# Patient Record
Sex: MALE | Race: ASIAN | NOT HISPANIC OR LATINO
[De-identification: names, ages, dates, MRNs, and addresses within clinical notes are randomized per-mention and may not be internally consistent; named-entity substitution may affect disease eponyms.]

---

## 2023-08-24 PROBLEM — Z00.00 ENCOUNTER FOR PREVENTIVE HEALTH EXAMINATION: Status: ACTIVE | Noted: 2023-08-24

## 2023-09-08 ENCOUNTER — APPOINTMENT (OUTPATIENT)
Dept: ORTHOPEDIC SURGERY | Facility: CLINIC | Age: 28
End: 2023-09-08
Payer: OTHER MISCELLANEOUS

## 2023-09-08 VITALS — HEIGHT: 60 IN | WEIGHT: 136 LBS | BODY MASS INDEX: 26.7 KG/M2

## 2023-09-08 DIAGNOSIS — Z78.9 OTHER SPECIFIED HEALTH STATUS: ICD-10-CM

## 2023-09-08 DIAGNOSIS — M54.12 RADICULOPATHY, CERVICAL REGION: ICD-10-CM

## 2023-09-08 PROCEDURE — 99203 OFFICE O/P NEW LOW 30 MIN: CPT

## 2023-09-08 NOTE — WORK
[Torn Ligament/Tendon/Muscle] : torn ligament, tendon or muscle [Was the competent medical cause of the injury] : was the competent medical cause of the injury [Are consistent with the injury] : are consistent with the injury [Consistent with my objective findings] : consistent with my objective findings [Mild Partial] : mild partial [Does not reveal pre-existing condition(s) that may affect treatment/prognosis] : does not reveal pre-existing condition(s) that may affect treatment/prognosis [Can return to work without limitations on ______] : can return to work without limitations on [unfilled] [N/A] : : Not Applicable [Patient] : patient [No Rx restrictions] : No Rx restrictions. [I provided the services listed above] :  I provided the services listed above. [FreeTextEntry1] : 50% [Heavy Work:] : Heavy Work: Exerting 50 to 100 pounds of force occasionally, and/or 25 to 50 pounds of force frequently, and/or 10 to 20 pounds of force constantly to move objects. Physical demand requirements are in excess of those for Medium Work

## 2023-09-08 NOTE — DISCUSSION/SUMMARY
[de-identified] : History, clinical examination and imaging were most consistent with: -right shoulder partial rotator cuff tear -cervical radiculopathy  The diagnosis was explained in detail. The potential non-surgical and surgical treatments were reviewed. The relative risks and benefits of each option were considered relative to the patients age, activity level, medical history, symptom severity and previously attempted treatments.  The patient was advised to consult with their primary medical provider prior to initiation of any new medications to reduce the risk of adverse effects specific to their long-term home medications and medical history. The risk of gastrointestinal irritation and kidney injury specific to long-term NSAID use was discussed.  -Over the counter NSAID as needed for pain. -There is adequate clinical concern the patient may have a condition that could benefit from orthopedic intervention. An MRI was therefore ordered to evaluate for structural abnormality and further guide treatment recommendations. -Referral provided to Dr. overton for further management of his cervical injury.  -Follow up when imaging completed for the shoulder, will likely discuss CSI and return to PT.   (MDM)  Problem Complexity -Moderate: acute, complicated injury  Risk -Low: over the counter medication  -Patient has not been seen by another provider in my practice within the past 2 years who specializes in orthopedic sports medicine, shoulder or elbow surgery.

## 2023-09-08 NOTE — HISTORY OF PRESENT ILLNESS
[de-identified] : Date of initial evaluation is 09/08 Patient age is 28 Occupation is Research Assistant at [employer]  Body part causing symptoms is the RT shoulder and neck Symptoms began while was at work, he was lifting a heavy battery he felt a pop Location of pain is lateral Quality of pain is dull  Pain score at rest is 6/10 Pain score during activity is 9/10 Radicular symptoms are intermittently present on right Prior treatments include PT  Patient's condition is associated with workers compensation, DOI 1/3/23 He has been working full duty

## 2023-09-08 NOTE — IMAGING
[de-identified] : (Exam: Shoulder)   Laterality is right    Patient is in no acute distress, alert and oriented Sensation is grossly intact to light touch in the hand Motor function is 5/5 in the hand Capillary refill is less than 2 seconds in the fingers Lymphadenopathy is not present Peripheral edema is not present   Skin is intact Swelling is not present Atrophy is not present Scapular winging is not present Deformity of the AC joint is not present Deformity of the biceps is not present   Bicipital groove tenderness is present AC joint tenderness is not present Scapulothoracic tenderness is not present   Active forward elevation is 175 Passive forward elevation is 175 External rotation at the side is 80 Internal rotation behind the back is to the level of T7   Forward elevation strength is 5/5 External rotation strength at the side is 5/5 Internal rotation strength at the side is 5/5 Deltoid strength of anterior, posterior and lateral heads is 5/5   Villatoro test is abnormal OBriens test is abnormal Empty can test is abnormal Speeds test is abnormal Cross body adduction test is normal Belly press test is normal Apprehension and relocation is normal Sulcus sign is normal  (Exam: Cervical Spine)   Skin is intact Swelling is not present Atrophy is not present   Bony tenderness is not present Paraspinal tenderness is present Bony stepoff is not present   Range of motion is limited   Shoulder abduction strength is 5/5 Elbow flexion strength is 5/5 Elbow extension strength is 5/5 Wrist flexion strength is 5/5 Wrist extension strength is 5/5 Finger abduction strength is 5/5   C5-T1 sensation is intact  Biceps, triceps and patellar reflexes are 2+ and symmetric  Clonus is not present Red's sign is not present Spurling's test is abnormal    [Straightening consistent with spasm] : Straightening consistent with spasm [No spinal deformity, fracture, lytic lesion, or marked single level collapse] : No spinal deformity, fracture, lytic lesion, or marked single level collapse [Right] : right shoulder [Outside films reviewed] : Outside films reviewed [There are no fractures, subluxations or dislocations. No significant abnormalities are seen] : There are no fractures, subluxations or dislocations. No significant abnormalities are seen

## 2023-09-21 ENCOUNTER — RESULT REVIEW (OUTPATIENT)
Age: 28
End: 2023-09-21

## 2023-10-02 ENCOUNTER — APPOINTMENT (OUTPATIENT)
Dept: ORTHOPEDIC SURGERY | Facility: CLINIC | Age: 28
End: 2023-10-02
Payer: OTHER MISCELLANEOUS

## 2023-10-02 VITALS — BODY MASS INDEX: 26.7 KG/M2 | HEIGHT: 60 IN | WEIGHT: 136 LBS

## 2023-10-02 PROCEDURE — 99214 OFFICE O/P EST MOD 30 MIN: CPT | Mod: 25

## 2023-10-02 PROCEDURE — 20610 DRAIN/INJ JOINT/BURSA W/O US: CPT | Mod: RT

## 2023-10-04 ENCOUNTER — APPOINTMENT (OUTPATIENT)
Dept: ORTHOPEDIC SURGERY | Facility: CLINIC | Age: 28
End: 2023-10-04
Payer: OTHER MISCELLANEOUS

## 2023-10-04 PROCEDURE — 99215 OFFICE O/P EST HI 40 MIN: CPT

## 2023-10-04 RX ORDER — METHYLPREDNISOLONE 4 MG/1
4 TABLET ORAL
Qty: 1 | Refills: 0 | Status: ACTIVE | COMMUNITY
Start: 2023-10-04 | End: 1900-01-01

## 2023-10-04 RX ORDER — CYCLOBENZAPRINE HYDROCHLORIDE 5 MG/1
5 TABLET, FILM COATED ORAL
Qty: 60 | Refills: 0 | Status: ACTIVE | COMMUNITY
Start: 2023-10-04 | End: 1900-01-01

## 2023-11-17 ENCOUNTER — APPOINTMENT (OUTPATIENT)
Dept: ORTHOPEDIC SURGERY | Facility: CLINIC | Age: 28
End: 2023-11-17

## 2023-12-07 ENCOUNTER — APPOINTMENT (OUTPATIENT)
Dept: ORTHOPEDIC SURGERY | Facility: CLINIC | Age: 28
End: 2023-12-07
Payer: OTHER MISCELLANEOUS

## 2023-12-07 PROCEDURE — 99214 OFFICE O/P EST MOD 30 MIN: CPT

## 2023-12-07 RX ORDER — GABAPENTIN 300 MG/1
300 CAPSULE ORAL
Qty: 30 | Refills: 1 | Status: ACTIVE | COMMUNITY
Start: 2023-12-07 | End: 1900-01-01

## 2023-12-08 ENCOUNTER — APPOINTMENT (OUTPATIENT)
Dept: ORTHOPEDIC SURGERY | Facility: CLINIC | Age: 28
End: 2023-12-08
Payer: OTHER MISCELLANEOUS

## 2023-12-08 PROCEDURE — 99214 OFFICE O/P EST MOD 30 MIN: CPT

## 2023-12-08 PROCEDURE — 72100 X-RAY EXAM L-S SPINE 2/3 VWS: CPT

## 2023-12-19 ENCOUNTER — APPOINTMENT (OUTPATIENT)
Dept: NEUROLOGY | Facility: CLINIC | Age: 28
End: 2023-12-19
Payer: OTHER MISCELLANEOUS

## 2023-12-19 PROCEDURE — 95912 NRV CNDJ TEST 11-12 STUDIES: CPT

## 2023-12-19 PROCEDURE — 95886 MUSC TEST DONE W/N TEST COMP: CPT

## 2024-02-09 ENCOUNTER — APPOINTMENT (OUTPATIENT)
Dept: ORTHOPEDIC SURGERY | Facility: CLINIC | Age: 29
End: 2024-02-09

## 2024-03-21 ENCOUNTER — APPOINTMENT (OUTPATIENT)
Dept: ORTHOPEDIC SURGERY | Facility: CLINIC | Age: 29
End: 2024-03-21
Payer: OTHER MISCELLANEOUS

## 2024-03-21 VITALS — HEIGHT: 60 IN | BODY MASS INDEX: 26.7 KG/M2 | WEIGHT: 136 LBS

## 2024-03-21 DIAGNOSIS — M48.02 SPINAL STENOSIS, CERVICAL REGION: ICD-10-CM

## 2024-03-21 DIAGNOSIS — M51.36 OTHER INTERVERTEBRAL DISC DEGENERATION, LUMBAR REGION: ICD-10-CM

## 2024-03-21 DIAGNOSIS — M47.817 SPONDYLOSIS W/OUT MYELOPATHY OR RADICULOPATHY, LUMBOSACRAL REGION: ICD-10-CM

## 2024-03-21 DIAGNOSIS — M62.838 OTHER MUSCLE SPASM: ICD-10-CM

## 2024-03-21 DIAGNOSIS — M51.37 OTHER INTERVERTEBRAL DISC DEGENERATION, LUMBOSACRAL REGION: ICD-10-CM

## 2024-03-21 DIAGNOSIS — M48.061 SPINAL STENOSIS, LUMBAR REGION WITHOUT NEUROGENIC CLAUDICATION: ICD-10-CM

## 2024-03-21 DIAGNOSIS — M47.22 OTHER SPONDYLOSIS WITH RADICULOPATHY, CERVICAL REGION: ICD-10-CM

## 2024-03-21 DIAGNOSIS — M54.12 RADICULOPATHY, CERVICAL REGION: ICD-10-CM

## 2024-03-21 DIAGNOSIS — M54.16 RADICULOPATHY, LUMBAR REGION: ICD-10-CM

## 2024-03-21 PROCEDURE — 99214 OFFICE O/P EST MOD 30 MIN: CPT

## 2024-03-21 NOTE — PHYSICAL EXAM
[TextEntry] : Constitutional: Well groomed and developed.  Respiratory: Normal, unlabored breathing. No use of accessory muscles.  Skin: No rashes or ulcers. Skin warm and dry.  Psychiatric: Oriented to time, place, person and event. No acute distress. Gait: Heel to toe Patient able to walk on toes and heels.    Neck:   Posture: normal  AROM:  Flexion- chin to chest  Extension- 20  R rotation- 45 L rotation- 70 Moderate pain with neck ROM.    Lumbar spine:  Posture: Normal on coronal and sagittal alignment  AROM:  Flexion 90 Extension 10  Moderate pain with simulated truncal motion   Tenderness:  Cervical: Moderate tenderness on palpation     TTP over right paracervical Thoracic: No tenderness on palpation  Lumbar: Moderate tenderness on palpation    TTP on the right L5-S1 Sacrum/coccyx: no tenderness on palpation  Greater trochanteric bursa:  No tenderness  PSIS: None                                          Motor:                       R               L                         UE:                   Deltoid            5                5 WE                 5                5 Triceps          5                5 Biceps           5                5                5                5 Intrinsics       5                5                         R             L LE:                                IS                    5             5 Quad              5             5 TA                  5             5 EHL                5             5 Gastroc         5             5                                   R         L DTR:  Biceps:                     2+        2+ Brachioradialis:        2+        2+ Triceps:                   2+         2+  Sensory: Light touch sensation intact on C5-T1  Spurling sign: Normal  Babinski's sign: Negative Bilaterally Red's sign: Negative Bilaterally  Abduction sign: Negative Bilaterally     R  L DTR: Patella  2+  2+ Achilles  2+  2+  Sensory: Light touch sensation intact T12-S1  Babinski's Sign: Negative bilaterally  Straight leg raise test: Negative bilaterally  YESENIA test: negative bilaterally

## 2024-03-21 NOTE — HISTORY OF PRESENT ILLNESS
[Lower back] : lower back [Sudden] : sudden [9] : 9 [Dull/Aching] : dull/aching [5] : 5 [Sharp] : sharp [Constant] : constant [Part time] : Work status: part time [de-identified] : 03/21/24 Patient presents in office today for his neck and lumbar spine. Here  to review the results from his EMG test. States he is now having more radiating pain down his RT leg. Admits to numbness and tingling. Says his muscles feel very tight.  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

## 2024-03-21 NOTE — DATA REVIEWED
[EMG Nerve Conduction] : A EMG Nerve Conduction test was completed of the [Bilateral] : bilateral [Positive] : positive [Upper extremity] : upper extremity [FreeTextEntry1] : EMG B/L UE: Mild bilateral carpal tunnel

## 2024-03-21 NOTE — ASSESSMENT
[FreeTextEntry1] : MRI C-Spine 9/2023 Right paracentral HNP C5-6 with moderate stenosis Bulging disc C6-7  EMG B/L UE: Mild bilateral carpal tunnel   29 yo male presents today for follow up on his neck and back following work injury 1/2023. Patient with increased pain and spasm in both his neck and back without PT. I recommend resuming PT as well as proceeding with imaging of the lumbar spine given persistent low back pain with radiculopathy.   - Patient given prescription for LE EMG/NCS, follow up after study is completed to discuss results.   - Patient given prescription for lumbar MRI, follow up after study is completed to discuss results.   - Patient with increased muscle spasm, loss of range of motion due to not having PT.  Patient will benefit from beginning PT. Recommend physical therapy to regain range of motion, strengthening and symptomatic improvement. Prescription given in office today.   - Prescription given for Voltaren gel today. Advised patient to apply to the area of pain as needed.   - Recommend NSAIDs PRN - Recommend heating pad use to decrease muscle spasm - Discussed the importance of home exercises, including but not limited to hamstring stretching and core strengthening   Patient was educated on their diagnosis today. All questions answered and patient expressed understanding.   F/U after MRI/EMG

## 2024-04-19 ENCOUNTER — RESULT REVIEW (OUTPATIENT)
Age: 29
End: 2024-04-19

## 2024-05-06 RX ORDER — DICLOFENAC SODIUM 1% 10 MG/G
1 GEL TOPICAL 4 TIMES DAILY
Qty: 1 | Refills: 2 | Status: ACTIVE | COMMUNITY
Start: 2024-03-21 | End: 1900-01-01

## 2024-05-16 ENCOUNTER — APPOINTMENT (OUTPATIENT)
Dept: ORTHOPEDIC SURGERY | Facility: CLINIC | Age: 29
End: 2024-05-16

## 2024-05-30 ENCOUNTER — APPOINTMENT (OUTPATIENT)
Dept: ORTHOPEDIC SURGERY | Facility: CLINIC | Age: 29
End: 2024-05-30
Payer: OTHER MISCELLANEOUS

## 2024-05-30 VITALS — HEIGHT: 60 IN | BODY MASS INDEX: 26.9 KG/M2 | WEIGHT: 137 LBS

## 2024-05-30 DIAGNOSIS — M75.111 INCOMPLETE ROTATOR CUFF TEAR OR RUPTURE OF RIGHT SHOULDER, NOT SPECIFIED AS TRAUMATIC: ICD-10-CM

## 2024-05-30 PROCEDURE — 20610 DRAIN/INJ JOINT/BURSA W/O US: CPT | Mod: RT

## 2024-05-30 PROCEDURE — 73030 X-RAY EXAM OF SHOULDER: CPT | Mod: RT

## 2024-05-30 PROCEDURE — 99214 OFFICE O/P EST MOD 30 MIN: CPT | Mod: 25

## 2024-05-30 NOTE — IMAGING
[Straightening consistent with spasm] : Straightening consistent with spasm [No spinal deformity, fracture, lytic lesion, or marked single level collapse] : No spinal deformity, fracture, lytic lesion, or marked single level collapse [Right] : right shoulder [Outside films reviewed] : Outside films reviewed [There are no fractures, subluxations or dislocations. No significant abnormalities are seen] : There are no fractures, subluxations or dislocations. No significant abnormalities are seen [de-identified] : (Exam: Shoulder)   Laterality is right    Patient is in no acute distress, alert and oriented Sensation is grossly intact to light touch in the hand Motor function is 5/5 in the hand Capillary refill is less than 2 seconds in the fingers Lymphadenopathy is not present Peripheral edema is not present   Skin is intact Swelling is not present Atrophy is not present Scapular winging is not present Deformity of the AC joint is not present Deformity of the biceps is not present   Bicipital groove tenderness is present AC joint tenderness is not present Scapulothoracic tenderness is not present   Active forward elevation is 175 Passive forward elevation is 175 External rotation at the side is 80 Internal rotation behind the back is to the level of T7   Forward elevation strength is 5/5 External rotation strength at the side is 5/5 Internal rotation strength at the side is 5/5 Deltoid strength of anterior, posterior and lateral heads is 5/5   Villatoro test is abnormal OBriens test is abnormal Empty can test is abnormal Speeds test is abnormal Cross body adduction test is normal Belly press test is normal Apprehension and relocation is normal Sulcus sign is normal

## 2024-05-30 NOTE — DATA REVIEWED
[MRI] : MRI [Right] : of the right [Shoulder] : shoulder [Report was reviewed and noted in the chart] : The report was reviewed and noted in the chart [I reviewed the films/CD and agree] : I reviewed the films/CD and agree [FreeTextEntry1] : low grade partial supra tear, biceps tendonitis

## 2024-05-30 NOTE — HISTORY OF PRESENT ILLNESS
[de-identified] : 5/30/24: Follow up on right shoulder. Patient reports over the past 2 months he has been experiencing pain in the right shoulder. he had good relief from the CSI at last visit. he was not approved yet for PT by . he is seeing Dr overton for his neck and back. he has been working full duty.   10/02/2023 Following up on MRI results. symptoms are unchanged. seeing dr overton later this week. remains working full duty.  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

## 2024-05-30 NOTE — DISCUSSION/SUMMARY
[de-identified] : History, clinical examination and imaging were most consistent with: -right shoulder partial rotator cuff tear  The diagnosis was explained in detail. The potential non-surgical and surgical treatments were reviewed. The relative risks and benefits of each option were considered relative to the patients age, activity level, medical history, symptom severity and previously attempted treatments.  The patient was advised to consult with their primary medical provider prior to initiation of any new medications to reduce the risk of adverse effects specific to their long-term home medications and medical history. The risk of gastrointestinal irritation and kidney injury specific to long-term NSAID use was discussed.  -Recommend PT pending  approval for stretching and strengthening. -Repeat cortisone injection provided today for symptom relief. -Over the counter NSAID as needed for pain. -Follow up as scheduled with Dr. Cedillo regarding the cervical spine. -Follow up in 2 months, if symptoms persist after PT consider surgical options.    (MDM)  Problem Complexity -Moderate: acute, complicated injury  Risk -Moderate: injection   (Procedure: Corticosteroid Injection)   Injection location was the: -right subacromial bursa   Injection contents were: 40 mg of kenalog and 5 mL of 1% lidocaine   Procedure Details: -Informed consent was obtained. Discussed possible risks of corticosteroid injection including hyperglycemia, infection and skin discoloration. -Discussed that due to infection risk, an interval between injection and any future surgery is 6 weeks for arthroscopy and 3 months for arthroplasty. -Injection performed using aseptic technique. Hemostasis was confirmed. -Procedure was tolerated well with no complications.

## 2024-07-02 ENCOUNTER — APPOINTMENT (OUTPATIENT)
Dept: NEUROLOGY | Facility: CLINIC | Age: 29
End: 2024-07-02
Payer: OTHER MISCELLANEOUS

## 2024-07-02 PROCEDURE — 95886 MUSC TEST DONE W/N TEST COMP: CPT

## 2024-07-02 PROCEDURE — 95909 NRV CNDJ TST 5-6 STUDIES: CPT

## 2024-07-03 ENCOUNTER — APPOINTMENT (OUTPATIENT)
Dept: ORTHOPEDIC SURGERY | Facility: CLINIC | Age: 29
End: 2024-07-03
Payer: OTHER MISCELLANEOUS

## 2024-07-03 DIAGNOSIS — M48.061 SPINAL STENOSIS, LUMBAR REGION WITHOUT NEUROGENIC CLAUDICATION: ICD-10-CM

## 2024-07-03 DIAGNOSIS — M47.817 SPONDYLOSIS W/OUT MYELOPATHY OR RADICULOPATHY, LUMBOSACRAL REGION: ICD-10-CM

## 2024-07-03 DIAGNOSIS — M51.36 OTHER INTERVERTEBRAL DISC DEGENERATION, LUMBAR REGION: ICD-10-CM

## 2024-07-03 DIAGNOSIS — M48.02 SPINAL STENOSIS, CERVICAL REGION: ICD-10-CM

## 2024-07-03 DIAGNOSIS — M47.22 OTHER SPONDYLOSIS WITH RADICULOPATHY, CERVICAL REGION: ICD-10-CM

## 2024-07-03 DIAGNOSIS — M51.26 OTHER INTERVERTEBRAL DISC DISPLACEMENT, LUMBAR REGION: ICD-10-CM

## 2024-07-03 DIAGNOSIS — M51.37 OTHER INTERVERTEBRAL DISC DEGENERATION, LUMBOSACRAL REGION: ICD-10-CM

## 2024-07-03 DIAGNOSIS — M62.838 OTHER MUSCLE SPASM: ICD-10-CM

## 2024-07-03 DIAGNOSIS — M54.16 RADICULOPATHY, LUMBAR REGION: ICD-10-CM

## 2024-07-03 DIAGNOSIS — M54.12 RADICULOPATHY, CERVICAL REGION: ICD-10-CM

## 2024-07-03 PROCEDURE — 99214 OFFICE O/P EST MOD 30 MIN: CPT

## 2024-07-31 ENCOUNTER — APPOINTMENT (OUTPATIENT)
Dept: ORTHOPEDIC SURGERY | Facility: AMBULATORY SURGERY CENTER | Age: 29
End: 2024-07-31
Payer: OTHER MISCELLANEOUS

## 2024-07-31 PROCEDURE — 64483 NJX AA&/STRD TFRM EPI L/S 1: CPT | Mod: LT

## 2024-07-31 PROCEDURE — 76000 FLUOROSCOPY <1 HR PHYS/QHP: CPT | Mod: 26,59

## 2024-08-01 ENCOUNTER — APPOINTMENT (OUTPATIENT)
Dept: ORTHOPEDIC SURGERY | Facility: CLINIC | Age: 29
End: 2024-08-01

## 2024-08-10 ENCOUNTER — NON-APPOINTMENT (OUTPATIENT)
Age: 29
End: 2024-08-10

## 2024-08-15 ENCOUNTER — APPOINTMENT (OUTPATIENT)
Dept: ORTHOPEDIC SURGERY | Facility: CLINIC | Age: 29
End: 2024-08-15
Payer: OTHER MISCELLANEOUS

## 2024-08-15 DIAGNOSIS — M48.061 SPINAL STENOSIS, LUMBAR REGION WITHOUT NEUROGENIC CLAUDICATION: ICD-10-CM

## 2024-08-15 DIAGNOSIS — M51.36 OTHER INTERVERTEBRAL DISC DEGENERATION, LUMBAR REGION: ICD-10-CM

## 2024-08-15 DIAGNOSIS — M51.37 OTHER INTERVERTEBRAL DISC DEGENERATION, LUMBOSACRAL REGION: ICD-10-CM

## 2024-08-15 DIAGNOSIS — M54.12 RADICULOPATHY, CERVICAL REGION: ICD-10-CM

## 2024-08-15 DIAGNOSIS — M51.26 OTHER INTERVERTEBRAL DISC DISPLACEMENT, LUMBAR REGION: ICD-10-CM

## 2024-08-15 DIAGNOSIS — M62.838 OTHER MUSCLE SPASM: ICD-10-CM

## 2024-08-15 DIAGNOSIS — M47.817 SPONDYLOSIS W/OUT MYELOPATHY OR RADICULOPATHY, LUMBOSACRAL REGION: ICD-10-CM

## 2024-08-15 DIAGNOSIS — M48.02 SPINAL STENOSIS, CERVICAL REGION: ICD-10-CM

## 2024-08-15 DIAGNOSIS — M54.16 RADICULOPATHY, LUMBAR REGION: ICD-10-CM

## 2024-08-15 DIAGNOSIS — M47.22 OTHER SPONDYLOSIS WITH RADICULOPATHY, CERVICAL REGION: ICD-10-CM

## 2024-08-15 PROCEDURE — 99214 OFFICE O/P EST MOD 30 MIN: CPT

## 2024-08-15 NOTE — HISTORY OF PRESENT ILLNESS
[de-identified] : Follow up lumbar spine and TFESI L5-S1 7/31/2024  Pt reports ~95% improvement with injection but reports continued pain with prolonged sitting, and numbness at injection site.  Denies taking meds for pain.   Today's Pain with walking ~2/10

## 2024-08-15 NOTE — ASSESSMENT
[FreeTextEntry1] : MRI C-Spine 9/2023 Right paracentral HNP C5-6 with moderate stenosis Bulging disc C6-7  EMG B/L UE: Mild bilateral carpal tunnel   EMG B/L LE: Acute and chronic right L4, L5 radiculopathies   4/2024 MRI of L-Spine was reviewed today and is as follows:  Left paracentral to foraminal HNP L5-S1  29 yo male presents today for follow up on his neck and back following work injury 1/2023.  MRI and EMG of the lumbar spine/LE detailed above shows pinched nerve. The patient reports he had 95% relief with injection. The plan at this time is to start PT for increase in strength and ROM.   - Recommend physical therapy to regain range of motion, strengthening and symptomatic improvement. Prescription given in office today.   - Patient will continue HEP as detailed in home exercise booklet given in office. Emphasized daily stretching and strengthening.   - Recommend NSAIDs PRN - Recommend heating pad use to decrease muscle spasm - Discussed the importance of home exercises, including but not limited to hamstring stretching and core strengthening   Patient was educated on their diagnosis today. All questions answered and patient expressed understanding.  Worker's compensation 0% temporarily disabled.    Follow up in 2 months

## 2024-10-31 ENCOUNTER — APPOINTMENT (OUTPATIENT)
Dept: ORTHOPEDIC SURGERY | Facility: CLINIC | Age: 29
End: 2024-10-31
Payer: OTHER MISCELLANEOUS

## 2024-10-31 DIAGNOSIS — M48.061 SPINAL STENOSIS, LUMBAR REGION WITHOUT NEUROGENIC CLAUDICATION: ICD-10-CM

## 2024-10-31 DIAGNOSIS — M48.02 SPINAL STENOSIS, CERVICAL REGION: ICD-10-CM

## 2024-10-31 PROCEDURE — 99214 OFFICE O/P EST MOD 30 MIN: CPT

## 2025-01-09 ENCOUNTER — APPOINTMENT (OUTPATIENT)
Dept: ORTHOPEDIC SURGERY | Facility: CLINIC | Age: 30
End: 2025-01-09
Payer: OTHER MISCELLANEOUS

## 2025-01-09 DIAGNOSIS — M48.02 SPINAL STENOSIS, CERVICAL REGION: ICD-10-CM

## 2025-01-09 DIAGNOSIS — M48.061 SPINAL STENOSIS, LUMBAR REGION WITHOUT NEUROGENIC CLAUDICATION: ICD-10-CM

## 2025-01-09 PROCEDURE — 99245 OFF/OP CONSLTJ NEW/EST HI 55: CPT
